# Patient Record
Sex: FEMALE | URBAN - METROPOLITAN AREA
[De-identification: names, ages, dates, MRNs, and addresses within clinical notes are randomized per-mention and may not be internally consistent; named-entity substitution may affect disease eponyms.]

---

## 2019-05-08 ENCOUNTER — OFFICE VISIT (OUTPATIENT)
Dept: URBAN - METROPOLITAN AREA CLINIC 76 | Facility: CLINIC | Age: 64
End: 2019-05-08
Payer: MEDICARE

## 2019-05-08 DIAGNOSIS — H25.13 AGE-RELATED NUCLEAR CATARACT, BILATERAL: ICD-10-CM

## 2019-05-08 DIAGNOSIS — H35.52 PIGMENTARY RETINAL DYSTROPHY: ICD-10-CM

## 2019-05-08 DIAGNOSIS — H43.813 VITREOUS DEGENERATION, BILATERAL: ICD-10-CM

## 2019-05-08 DIAGNOSIS — E11.9 TYPE 2 DIABETES MELLITUS WITHOUT COMPLICATIONS: ICD-10-CM

## 2019-05-08 DIAGNOSIS — H53.19 OTHER SUBJECTIVE VISUAL DISTURBANCES: Primary | ICD-10-CM

## 2019-05-08 DIAGNOSIS — H40.023 OPEN ANGLE WITH BORDERLINE FINDINGS, HIGH RISK, BILATERAL: ICD-10-CM

## 2019-05-08 PROCEDURE — 92014 COMPRE OPH EXAM EST PT 1/>: CPT | Performed by: OPTOMETRIST

## 2019-05-08 PROCEDURE — 92004 COMPRE OPH EXAM NEW PT 1/>: CPT | Performed by: OPTOMETRIST

## 2019-05-08 ASSESSMENT — INTRAOCULAR PRESSURE
OD: 17
OS: 17

## 2019-05-08 NOTE — IMPRESSION/PLAN
Impression: Age-related nuclear cataract, bilateral: H25.13. OU. Plan: Cataracts affecting vision some, but no surgery is currently recommended. The patient will monitor vision changes and contact us with any decrease in vision. Continue to monitor.

## 2019-05-08 NOTE — IMPRESSION/PLAN
Impression: Diagnosis: Type 2 diabetes mellitus without complications. Code: E11.9. Plan: Discussed diagnosis with patient. No diabetic retinopathy. Discussed ocular and systemic benefits of blood sugar control. Will continue to monitor.

## 2019-05-08 NOTE — IMPRESSION/PLAN
Impression: Other subjective visual disturbances: H53.19. pt reports symptoms OS (zig-zags/lightning bolts, pressure sensation, dizziness, nausea and diplopia) that lasted 2 hours. No new ocular findings that would relate to new symptoms. Suspect TIA. Plan: Discussed. Advise seeing PCP/specialists for further testing. Rule out risk of stroke. Check HTN/DM. Consider MRI of brain/orbits.

## 2019-05-08 NOTE — IMPRESSION/PLAN
Impression: Diagnosis: Open angle with borderline findings, high risk, bilateral. Code: H40.023. Side: OU. IOP OU controlled/borderline. Plan: Continue to monitor. Continue Latanoprost QHS OD, Timolol Ocudose QAM OD, Rhopressa QAM OD. Strongly advised medication compliance. Continue seeing Dr. Ashley Farr for glc.

## 2019-05-08 NOTE — IMPRESSION/PLAN
Impression: Diagnosis: Vitreous degeneration, bilateral. Code: X02.406. Side: OU.  Plan: Continue to monitor